# Patient Record
Sex: FEMALE | Race: WHITE | NOT HISPANIC OR LATINO | URBAN - METROPOLITAN AREA
[De-identification: names, ages, dates, MRNs, and addresses within clinical notes are randomized per-mention and may not be internally consistent; named-entity substitution may affect disease eponyms.]

---

## 2023-05-09 ENCOUNTER — INPATIENT (INPATIENT)
Facility: HOSPITAL | Age: 38
LOS: 1 days | Discharge: HOME | DRG: 391 | End: 2023-05-11
Attending: HOSPITALIST | Admitting: HOSPITALIST
Payer: MEDICAID

## 2023-05-09 VITALS
TEMPERATURE: 98 F | DIASTOLIC BLOOD PRESSURE: 84 MMHG | HEART RATE: 103 BPM | SYSTOLIC BLOOD PRESSURE: 126 MMHG | WEIGHT: 145.95 LBS | OXYGEN SATURATION: 99 % | RESPIRATION RATE: 18 BRPM | HEIGHT: 65 IN

## 2023-05-09 DIAGNOSIS — R13.10 DYSPHAGIA, UNSPECIFIED: ICD-10-CM

## 2023-05-09 DIAGNOSIS — F06.31 MOOD DISORDER DUE TO KNOWN PHYSIOLOGICAL CONDITION WITH DEPRESSIVE FEATURES: ICD-10-CM

## 2023-05-09 DIAGNOSIS — Z65.8 OTHER SPECIFIED PROBLEMS RELATED TO PSYCHOSOCIAL CIRCUMSTANCES: ICD-10-CM

## 2023-05-09 DIAGNOSIS — R63.4 ABNORMAL WEIGHT LOSS: ICD-10-CM

## 2023-05-09 DIAGNOSIS — F43.21 ADJUSTMENT DISORDER WITH DEPRESSED MOOD: ICD-10-CM

## 2023-05-09 LAB
ALBUMIN SERPL ELPH-MCNC: 4.2 G/DL — SIGNIFICANT CHANGE UP (ref 3.3–5)
ALP SERPL-CCNC: 36 U/L — LOW (ref 40–120)
ALT FLD-CCNC: 18 U/L — SIGNIFICANT CHANGE UP (ref 10–45)
AMPHET UR-MCNC: NEGATIVE — SIGNIFICANT CHANGE UP
ANION GAP SERPL CALC-SCNC: 11 MMOL/L — SIGNIFICANT CHANGE UP (ref 5–17)
AST SERPL-CCNC: 13 U/L — SIGNIFICANT CHANGE UP (ref 10–40)
BARBITURATES UR SCN-MCNC: NEGATIVE — SIGNIFICANT CHANGE UP
BASOPHILS # BLD AUTO: 0.03 K/UL — SIGNIFICANT CHANGE UP (ref 0–0.2)
BASOPHILS NFR BLD AUTO: 0.5 % — SIGNIFICANT CHANGE UP (ref 0–2)
BENZODIAZ UR-MCNC: NEGATIVE — SIGNIFICANT CHANGE UP
BILIRUB SERPL-MCNC: 0.8 MG/DL — SIGNIFICANT CHANGE UP (ref 0.2–1.2)
BUN SERPL-MCNC: 7 MG/DL — SIGNIFICANT CHANGE UP (ref 7–23)
CALCIUM SERPL-MCNC: 9.5 MG/DL — SIGNIFICANT CHANGE UP (ref 8.4–10.5)
CHLORIDE SERPL-SCNC: 104 MMOL/L — SIGNIFICANT CHANGE UP (ref 96–108)
CO2 SERPL-SCNC: 24 MMOL/L — SIGNIFICANT CHANGE UP (ref 22–31)
COCAINE METAB.OTHER UR-MCNC: NEGATIVE — SIGNIFICANT CHANGE UP
CREAT SERPL-MCNC: 1.04 MG/DL — SIGNIFICANT CHANGE UP (ref 0.5–1.3)
EGFR: 72 ML/MIN/1.73M2 — SIGNIFICANT CHANGE UP
EOSINOPHIL # BLD AUTO: 0.05 K/UL — SIGNIFICANT CHANGE UP (ref 0–0.5)
EOSINOPHIL NFR BLD AUTO: 0.8 % — SIGNIFICANT CHANGE UP (ref 0–6)
FLUAV AG NPH QL: SIGNIFICANT CHANGE UP
FLUBV AG NPH QL: SIGNIFICANT CHANGE UP
GLUCOSE SERPL-MCNC: 91 MG/DL — SIGNIFICANT CHANGE UP (ref 70–99)
HCT VFR BLD CALC: 44.6 % — SIGNIFICANT CHANGE UP (ref 34.5–45)
HGB BLD-MCNC: 15.3 G/DL — SIGNIFICANT CHANGE UP (ref 11.5–15.5)
IMM GRANULOCYTES NFR BLD AUTO: 0.2 % — SIGNIFICANT CHANGE UP (ref 0–0.9)
LIDOCAIN IGE QN: 72 U/L — LOW (ref 73–393)
LYMPHOCYTES # BLD AUTO: 1.63 K/UL — SIGNIFICANT CHANGE UP (ref 1–3.3)
LYMPHOCYTES # BLD AUTO: 26.5 % — SIGNIFICANT CHANGE UP (ref 13–44)
MCHC RBC-ENTMCNC: 29.9 PG — SIGNIFICANT CHANGE UP (ref 27–34)
MCHC RBC-ENTMCNC: 34.3 GM/DL — SIGNIFICANT CHANGE UP (ref 32–36)
MCV RBC AUTO: 87.1 FL — SIGNIFICANT CHANGE UP (ref 80–100)
METHADONE UR-MCNC: NEGATIVE — SIGNIFICANT CHANGE UP
MONOCYTES # BLD AUTO: 0.6 K/UL — SIGNIFICANT CHANGE UP (ref 0–0.9)
MONOCYTES NFR BLD AUTO: 9.8 % — SIGNIFICANT CHANGE UP (ref 2–14)
NEUTROPHILS # BLD AUTO: 3.82 K/UL — SIGNIFICANT CHANGE UP (ref 1.8–7.4)
NEUTROPHILS NFR BLD AUTO: 62.2 % — SIGNIFICANT CHANGE UP (ref 43–77)
NRBC # BLD: 0 /100 WBCS — SIGNIFICANT CHANGE UP (ref 0–0)
OPIATES UR-MCNC: NEGATIVE — SIGNIFICANT CHANGE UP
PCP SPEC-MCNC: SIGNIFICANT CHANGE UP
PCP UR-MCNC: NEGATIVE — SIGNIFICANT CHANGE UP
PLATELET # BLD AUTO: 243 K/UL — SIGNIFICANT CHANGE UP (ref 150–400)
POTASSIUM SERPL-MCNC: 3.8 MMOL/L — SIGNIFICANT CHANGE UP (ref 3.5–5.3)
POTASSIUM SERPL-SCNC: 3.8 MMOL/L — SIGNIFICANT CHANGE UP (ref 3.5–5.3)
PROT SERPL-MCNC: 7.7 G/DL — SIGNIFICANT CHANGE UP (ref 6–8.3)
RBC # BLD: 5.12 M/UL — SIGNIFICANT CHANGE UP (ref 3.8–5.2)
RBC # FLD: 12.1 % — SIGNIFICANT CHANGE UP (ref 10.3–14.5)
RSV RNA NPH QL NAA+NON-PROBE: SIGNIFICANT CHANGE UP
SARS-COV-2 RNA SPEC QL NAA+PROBE: SIGNIFICANT CHANGE UP
SODIUM SERPL-SCNC: 139 MMOL/L — SIGNIFICANT CHANGE UP (ref 135–145)
THC UR QL: NEGATIVE — SIGNIFICANT CHANGE UP
WBC # BLD: 6.14 K/UL — SIGNIFICANT CHANGE UP (ref 3.8–10.5)
WBC # FLD AUTO: 6.14 K/UL — SIGNIFICANT CHANGE UP (ref 3.8–10.5)

## 2023-05-09 PROCEDURE — 90792 PSYCH DIAG EVAL W/MED SRVCS: CPT

## 2023-05-09 PROCEDURE — 99221 1ST HOSP IP/OBS SF/LOW 40: CPT

## 2023-05-09 PROCEDURE — 71045 X-RAY EXAM CHEST 1 VIEW: CPT | Mod: 26

## 2023-05-09 PROCEDURE — 99223 1ST HOSP IP/OBS HIGH 75: CPT

## 2023-05-09 PROCEDURE — 93010 ELECTROCARDIOGRAM REPORT: CPT

## 2023-05-09 PROCEDURE — 99285 EMERGENCY DEPT VISIT HI MDM: CPT

## 2023-05-09 RX ORDER — SODIUM CHLORIDE 9 MG/ML
1000 INJECTION INTRAMUSCULAR; INTRAVENOUS; SUBCUTANEOUS ONCE
Refills: 0 | Status: COMPLETED | OUTPATIENT
Start: 2023-05-09 | End: 2023-05-09

## 2023-05-09 RX ORDER — ACETAMINOPHEN 500 MG
650 TABLET ORAL EVERY 6 HOURS
Refills: 0 | Status: DISCONTINUED | OUTPATIENT
Start: 2023-05-09 | End: 2023-05-11

## 2023-05-09 RX ORDER — ESCITALOPRAM OXALATE 10 MG/1
5 TABLET, FILM COATED ORAL
Refills: 0 | Status: DISCONTINUED | OUTPATIENT
Start: 2023-05-09 | End: 2023-05-11

## 2023-05-09 RX ORDER — LANOLIN ALCOHOL/MO/W.PET/CERES
3 CREAM (GRAM) TOPICAL AT BEDTIME
Refills: 0 | Status: DISCONTINUED | OUTPATIENT
Start: 2023-05-09 | End: 2023-05-11

## 2023-05-09 RX ORDER — RISPERIDONE 4 MG/1
0.25 TABLET ORAL AT BEDTIME
Refills: 0 | Status: DISCONTINUED | OUTPATIENT
Start: 2023-05-09 | End: 2023-05-11

## 2023-05-09 RX ORDER — SODIUM CHLORIDE 9 MG/ML
1000 INJECTION INTRAMUSCULAR; INTRAVENOUS; SUBCUTANEOUS
Refills: 0 | Status: DISCONTINUED | OUTPATIENT
Start: 2023-05-09 | End: 2023-05-11

## 2023-05-09 RX ORDER — ONDANSETRON 8 MG/1
4 TABLET, FILM COATED ORAL EVERY 8 HOURS
Refills: 0 | Status: DISCONTINUED | OUTPATIENT
Start: 2023-05-09 | End: 2023-05-11

## 2023-05-09 RX ADMIN — RISPERIDONE 0.25 MILLIGRAM(S): 4 TABLET ORAL at 21:37

## 2023-05-09 RX ADMIN — SODIUM CHLORIDE 1000 MILLILITER(S): 9 INJECTION INTRAMUSCULAR; INTRAVENOUS; SUBCUTANEOUS at 07:43

## 2023-05-09 RX ADMIN — ESCITALOPRAM OXALATE 5 MILLIGRAM(S): 10 TABLET, FILM COATED ORAL at 18:07

## 2023-05-09 NOTE — BH CONSULTATION LIAISON ASSESSMENT NOTE - SUMMARY
Pt is a 38 year old female who presents to [facility] for various physical and psychological complaints, psychiatry asked to evaluate patient for these sx.

## 2023-05-09 NOTE — H&P ADULT - ASSESSMENT
38F with no sign PMx with now progressive inability to talk and swallow, in addition to increased vocal tics, in setting of recent stressors, now admitted for inability to maintain adequate PO intake 38F with no sign PMx with now progressive inability to talk and swallow, in addition to increased vocal tics, in setting of recent stressors, now admitted for inability to maintain adequate PO intake    #Dysphagia  # 38F with no sign PMx with now progressive inability to talk and swallow, in addition to increased vocal tics, in setting of recent stressors, now admitted for inability to maintain adequate PO intake    #Dysphagia  #Hypophonia  #Vocal tics  #Possible psychosomatic disorder  -Possible underlying psychiatric component - symptoms started in setting of stressors (recent family deaths and decision to move out of the house)  -Psychiatry consult - case d/w [facility] psych  -Neurology consult - case d/w Dr. Peters  -Defer to psych and neuro if needs MRI brain (inpatient vs outpatient)  -Defer to psych/neuro if need to start benzos for tic disorder  -Speech eval for dysphagia    Case d/w patient's mother at bedside, all questions answered, agrees with plan

## 2023-05-09 NOTE — CONSULT NOTE ADULT - SUBJECTIVE AND OBJECTIVE BOX
General Neurology  Consult Note    HPI: General Neurology  Consult Note    HPI:  This is a 38F with a PMHx of anxiety (untreated, does not see a psychiatrist) who presents for "unable to swallow and speak." Patient has been treated on/off for sinusitis with antibiotics over the last 2 years - has been on intermittent antibiotics and steroids.Patient lives with her mother at home - and decided about 1 month ago she was going to move out of the house - which then resulted in "vocal tics" (blurts out words uncontrollably), and gradual decreased ability to swallow and speak. Also, had three close relatives die recently. She can whisper if she pinches her neck and only ate aaron sips of soup yesterday. Patient saw an outpatient movement disorder specialist (Dr. Saunders) who recommend CBT. She did not make a psychiatry appt and now comes to the ED. ROS + for 20 lbs weight loss in past several weeks, anxiety/depression. No suicidal thoughts.    Neurology was consulted for hypophonic, dysphagia and tics.  General Neurology  Consult Note    HPI:  This is a 38F with a PMHx of anxiety (untreated, does not see a psychiatrist) who presents for "unable to swallow and speak." Patient has been treated on/off for sinusitis with antibiotics over the last 2 years - has been on intermittent antibiotics and steroids. Patient lives with her mother at home - and decided about 1 month ago she was going to move out of the house - which then resulted in "vocal tics" (blurts out words uncontrollably), and gradual decreased ability to swallow and speak. Also, had three close relatives die recently. She can whisper if she pinches her neck and only ate aaron sips of soup yesterday. Patient saw an outpatient movement disorder specialist (Dr. Saunders) who recommend CBT. She did not make a psychiatry appt and now comes to the ED. ROS + for 20 lbs weight loss in past several weeks, anxiety/depression. No suicidal thoughts.    Neurology was consulted for hypophonic, dysphagia and tics.   Per the patient's mother, the patient has been under increased stress. Her symptoms started a few months after she was started on the steroids.     She denies headache, changes in vision, difficulty swallow. She is mute other than when she has her vocal tics where she says a unclear word without dysarthria, no profanity, and when she gently squeezes her trachea, she has a choppy voice. She denies LOC. She reports some trouble with balance but is otherwise able to walk.

## 2023-05-09 NOTE — SWALLOW BEDSIDE ASSESSMENT ADULT - SLP PERTINENT HISTORY OF CURRENT PROBLEM
38F with no sig PMHx although does have underlying anxiety (untreated, does not see a psychiatrist) who presents for "unable to swallow and speak." Patient has been treated on/off for sinusitis with antibiotics over the last 2 years - has been on intermittent antibiotics and steroids. Other than that, no sig hx. Patient lives with her mother at home - and decided about 1 month ago she was going to move out of the house - which then resulted in "vocal tics" (blurts out words uncontrollably), and gradual decrease ability to swallow and speak. Also had three close relates die recently. She can whisper if she pinches her neck and only ate aaron sips of soup yesterday. Patient saw an outpatient movement disorder specialist (Dr. Saunders) who recommend CBT. She did not make a psychiatry appt and now comes to the ED. ROS + for 20 lbs weight loss in past several weeks, anxiety/depression. No suicidal thoughts.

## 2023-05-09 NOTE — ED ADULT TRIAGE NOTE - LOCATION:
Left arm; Impairments in bilateral LE active ROM and strength right > left, functional mobility, posture, coordination, and balance.

## 2023-05-09 NOTE — BH CONSULTATION LIAISON ASSESSMENT NOTE - NSBHCHARTREVIEWLAB_PSY_A_CORE FT
15.3   6.14  )-----------( 243      ( 09 May 2023 07:45 )             44.6   05-09    139  |  104  |  7   ----------------------------<  91  3.8   |  24  |  1.04    Ca    9.5      09 May 2023 07:45    TPro  7.7  /  Alb  4.2  /  TBili  0.8  /  DBili  x   /  AST  13  /  ALT  18  /  AlkPhos  36<L>  05-09

## 2023-05-09 NOTE — BH CONSULTATION LIAISON ASSESSMENT NOTE - NSBHCONSULTRECOMMENDOTHER_PSY_A_CORE FT
For mood symptoms, start Lexapro 5mg PO Q daily, increase to 10mg after several days if tolerated.   Agree with neurology consult. Would recommend auto-immune work up.   Speech and swallow evaluation.   Consider CT of head and if negative would pursue an MRI of the brain.  Consider Risperdal trial to lessen tic sx, would start Risperdal 0.25mg PO Q HS- defer until neurology work up is completed.     Pt should be referred for CBT and grief counseling. Pt should be encouraged to establish an outpatient psychiatric prescriber for medication management. [facility] refer to [facility]. Address:  [address], [place] [address], Phone: [phone] For mood symptoms, start Lexapro 5mg PO Q daily, increase to 10mg after several days if tolerated.   Agree with neurology consult. Would recommend auto-immune work up.   Speech and swallow evaluation.  Consider CT of head and if negative would pursue an MRI of the brain.  Start Risperdal trial to lessen tic sx, would start Risperdal 0.25mg PO Q HS  Ativan 0.5 PO BID as needed for anxiety sx.   Pt should be referred for CBT and grief counseling. Pt should be encouraged to establish an outpatient psychiatric prescriber for medication management. Would refer to [facility]. Address: [address], [place] [address], Phone: [phone]

## 2023-05-09 NOTE — H&P ADULT - HISTORY OF PRESENT ILLNESS
This is a 38F with no sig PMHx although does have underlying anxiety (untreated, does not see a psychiatrist) who presents for "unable to swallow and speak." Patient has been treated on/off for sinusitis with antibiotics over the last 2 years - has been on intermittent antibiotics and steroids. Other than that, no sig hx. Patient lives with her mother at home - and decided about 1 month ago she was going to move out of the house - which then resulted in "vocal tics" (blurts out words uncontrollably), and gradual decrease ability to swallow and speak. Also had three close relates die recently. She can whisper if she pinches her neck and only ate aaron sips of soup yesterday. Patient saw an outpatient movement disorder specialist (Dr. Saunders) who recommend CBT. She did not make a psychiatry appt and now comes to the ED. ROS + for 20 lbs weight loss in past several weeks, anxiety/depression. No suicidal thoughts.

## 2023-05-09 NOTE — SWALLOW BEDSIDE ASSESSMENT ADULT - SWALLOW EVAL: DIAGNOSIS
Swallow pathophysiology Pt presents with a grossly functional oropharyngeal swallow, however overall intake impacted by psychological component as "eating and speaking" exacerbate onset of Tics. Oral motor peripheral examination unremarkable. Pt communicates via alphabet board/gestures, however able to verbalize with humming/melodic cuing. +vocal tics present. Pt self fed small amounts of thin liquids, puree and regular solids. Adequate oral grading, anterior containment, mastication and suspected adequate AP transport. Good oral clearance. Pharyngeal motor response appreciated, however noted with multiple swallows per bolus. Pt denies dysphagia/odynophagia, however says it's in her "mind". no overt s/sx of aspiration. Recommend regular/thin with consideration of food/drink preferences and encouragement. Defer to psychology/psychiatry and neurology.

## 2023-05-09 NOTE — CONSULT NOTE ADULT - MOTOR
5/5 strength aside from 4/5 bilateral  strength.  Mild shoulder shrugging when she has her vocalizations

## 2023-05-09 NOTE — BH CONSULTATION LIAISON ASSESSMENT NOTE - ATTENDING COMMENTS
Pt seen and evaluated today, high in the differential is a conversion disorder with neurological sx.   Pt with normal power in her hand, the ability to produce speech, a cough/tic which possibly worsens under stress, and   complicated family dynamics.   Pt agreed to trial of Risperdal ( for the tic), Lexapro- ( to treat anxiety/ and agoraphobic sx), and Ativan prn, as pt has "terror" of falling asleep in case she has to "flee". Pt did not demonstrate overt psychosis, paranoid ideation, will continue to evaluate and monitor response to medications.

## 2023-05-09 NOTE — BH CONSULTATION LIAISON ASSESSMENT NOTE - HPI (INCLUDE ILLNESS QUALITY, SEVERITY, DURATION, TIMING, CONTEXT, MODIFYING FACTORS, ASSOCIATED SIGNS AND SYMPTOMS)
Pending full consult  This is a 38F with no sig PMHx although does have underlying anxiety (untreated, does not see a psychiatrist) who presents for "unable to swallow and speak." Patient has been treated on/off for sinusitis with antibiotics over the last 2 years - has been on intermittent antibiotics and steroids. Other than that, no sig hx. Patient lives with her mother at home - and decided about 1 month ago she was going to move out of the house - which then resulted in "vocal tics" (blurts out words uncontrollably), and gradual decrease ability to swallow and speak. Also had three close relates die recently. She can whisper if she pinches her neck and only ate aaron sips of soup yesterday. Patient saw an outpatient movement disorder specialist (Dr. Saunders) who recommend CBT. She did not make a psychiatry appt and now comes to the ED. ROS + for 20 lbs weight loss in past several weeks, anxiety/depression. No suicidal thoughts. (09 May 2023 09:28)    Psychiatry C/L: Asked to evaluate this 38 year old female for various physical and psychological complaints. Pt seen and evaluated at bedside, she is pleasant, calm and cooperative with writer. Pt reports she cannot speak or swallow and is using nonverbal ways to communicate with writer including shaking her head (yes or no) as well as an alphabet chart and hand gestures. Her mother provides most of the hx. Pt reports 2 months ago she suddenly developed a tic and has sudden twitches and nonsensical sounds. In addition to the tic, she has difficulty eating and swallowing and has a 20 lbs weight loss in the last month. Sleep- fair, appetite- poor, energy- low, concentration- poor. Pt reports she has several active stressors in her life and is not coping well: Her father  in , grandmother  in  and her uncle  in 2021 (she was close with all). Pt was planning to move out of her mothers house and into her own space 1 month ago which created more stress for her. Pts admits she has been anxious and depressed, exacerbated by the COVID-19 pandemic. Since the pandemic began, pt has been isolated and not leaving her home. Anxiety sx- intermittent worrisome thoughts, muscle tension, palpitations, sweating and SOB.  Pt has been evaluated by various medical specialist (Pulmonologist, PMD, neurologist)- all suggesting pt has a psychiatric illness as oppose to a medical etiology. She was referred to CBT and to an outpatient psychiatrist but has not followed up- had difficulty establishing an appt. Pt is not found to be suicidal, homicidal, manic or psychotic. See recs below.  This is a 38F with no sig PMHx although does have underlying anxiety (untreated, does not see a psychiatrist) who presents for "unable to swallow and speak." Patient has been treated on/off for sinusitis with antibiotics over the last 2 years - has been on intermittent antibiotics and steroids. Other than that, no sig hx. Patient lives with her mother at home - and decided about 1 month ago she was going to move out of the house - which then resulted in "vocal tics" (blurts out words uncontrollably), and gradual decrease ability to swallow and speak. Also had three close relates die recently. She can whisper if she pinches her neck and only ate aaron sips of soup yesterday. Patient saw an outpatient movement disorder specialist (Dr. Saunders) who recommend CBT. She did not make a psychiatry appt and now comes to the ED. ROS + for 20 lbs weight loss in past several weeks, anxiety/depression. No suicidal thoughts. (09 May 2023 09:28)    Psychiatry C/L: Asked to evaluate this 38 year old female for various physical and psychological complaints. Pt seen and evaluated at bedside, she is pleasant, calm and cooperative with writer. Pt reports she has difficulty speaking and swallow and is using nonverbal ways to communicate with writer including shaking her head (yes or no) as well as an alphabet chart and hand gestures. She is able to produce several words at a time and has hypophonic speech. Her mother provides most of the hx. Pt reports 2 months ago she suddenly developed a tic and has sudden twitches and nonsensical sounds. In addition to the tic, she has difficulty eating and swallowing and has a 20 lbs weight loss in the last month. Sleep- fair, appetite- poor, energy- low, concentration- poor. Pt reports she has several active stressors in her life and is not coping well: Her father  in , grandmother  in  and her uncle  in 2021 (she was close with all). Pt was planning to move out of her mothers house and into her own space 1 month ago which created more stress for her. Pts admits she has been anxious and depressed, exacerbated by the COVID-19 pandemic. Since the pandemic began, pt has been isolated and not leaving her home. Anxiety sx- intermittent worrisome thoughts, muscle tension, palpitations, sweating and SOB.  Pt has been evaluated by various medical specialist (Pulmonologist, PMD, neurologist, speech therapist)- all suggesting pt has a psychiatric illness as oppose to a medical etiology. She was referred to CBT and to an outpatient psychiatrist but has not followed up- had difficulty establishing an appt. Pt is not found to be suicidal, homicidal, manic or psychotic. See recs below.

## 2023-05-09 NOTE — ED PROVIDER NOTE - OBJECTIVE STATEMENT
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

## 2023-05-09 NOTE — ED ADULT NURSE NOTE - NSIMPLEMENTINTERV_GEN_ALL_ED
Implemented All Universal Safety Interventions:  [place] to call system. Call bell, personal items and telephone within reach. Instruct patient to call for assistance. Room bathroom lighting operational. Non-slip footwear when patient is off stretcher. Physically safe environment: no spills, clutter or unnecessary equipment. Stretcher in lowest position, wheels locked, appropriate side rails in place.

## 2023-05-09 NOTE — H&P ADULT - NSHPPHYSICALEXAM_GEN_ALL_CORE
Vital Signs Last 24 Hrs  T(F): 98.4 (09 May 2023 06:56), Max: 98.4 (09 May 2023 06:56)  HR: 103 (09 May 2023 06:56) (103 - 103)  BP: 126/84 (09 May 2023 06:56) (126/84 - 126/84)  RR: 18 (09 May 2023 06:56) (18 - 18)  SpO2: 99% (09 May 2023 06:56) (99% - 99%)    PHYSICAL EXAM:  GENERAL: NAD  HEAD:  Atraumatic, Normocephalic  EYES: EOMI, conjunctiva and sclera clear  ENMT: No gross hearing impairment, Moist mucous membranes, Good dentition, no thrush  NECK: Supple, No JVD  CHEST/LUNG: Clear to auscultation bilaterally, good air entry, non-labored breathing  HEART: RRR; S1/S2, No murmur  ABDOMEN: Soft, Nontender, Nondistended; Bowel sounds present  VASCULAR: Normal pulses, Normal capillary refill  EXTREMITIES: No calf tenderness, No cyanosis, No pitting edema  LYMPH: Normal; No lymphadenopathy noted  SKIN: Warm, Intact, No rashes noted  PSYCH: Normal mood, anxious, hypophonic  NERVOUS SYSTEM:  Good concentration; CN 2-12 intact, No focal deficits, + vocal tic

## 2023-05-09 NOTE — BH CONSULTATION LIAISON ASSESSMENT NOTE - NSSUICPROTFACT_PSY_ALL_CORE
Responsibility to children, family, or others/Identifies reasons for living/Positive therapeutic relationships/Sikhism beliefs

## 2023-05-09 NOTE — BH CONSULTATION LIAISON ASSESSMENT NOTE - DESCRIPTION
White female  Born in [place]  Lives with mother, father, step father and siblings in [place], [place]  Pt has a twin sister and also has twin brothers (age 14)  Single  Has no children  Completed associates degree at [employer]  Pt views herself as Mandaen and is Taoist

## 2023-05-09 NOTE — PATIENT PROFILE ADULT - FUNCTIONAL ASSESSMENT - DAILY ACTIVITY SCORE.
Any changes in Ambulatory Summary Sheet? No    Objective:   3030  R UT tightness >L       R  100,92,95 Av.67 lbs   L : 95,95,94 Av.67 lbs    Flexion: 4/5  R side bendin/5       L side bendin/5  R rotation: 4+/5    L rotation:  4+/5  NDI: 17 = moderate disability    Prior to today's treatment session, patient was screened for signs and symptoms related to COVID-19 including but not limited to verbally answering questions related to feeling ill, cough, or SOB, along with taking temperature via forehead thermometer. Patient presented with all negative signs and symptoms and had no fever >100 degrees Fahrenheit this date. Exercises: (No more than 4 columns)   Exercise/Equipment 2020 Date: 2020           WARM UP         UBE   3/3 F/B 3' forward/3' backward         TABLE      Manual therapy  See below See below   AROM  Not performed    Scapular retraction  1x15  1x20   Shoulder elevation/depression  1x15 1x20   Scapular punches  1x15 1x20   Prone press ups  Not performed    Prone HABD  Not performed       Prone row  Not performed     seated  Scapular protraction 1x15                                             PROPRIOCEPTION                                    MODALITIES See below See below See below                 Other Therapeutic Activities/Education:  Pt educated on EMG findings and myofacial problems. Home Exercise Program:  Pt to continue current HEP. Manual Treatments:  Patient seated manual therapy STM/ TPR to B UT, MT, and LT as well as intrascapular muscles using elbow to go deeper. Modalities:  Interferrential x15' seated to B UT/MT    Communication with other providers: none    Assessment:  (Response towards treatment session) (Pain Rating)  Pain rated 5/10 after treatment with no HA. Pt reports he feels much looser especially on R side. Pt reports he continues to have a spot on L side causing problems.      Plan for Next Session: 24

## 2023-05-09 NOTE — PATIENT PROFILE ADULT - FALL HARM RISK - UNIVERSAL INTERVENTIONS
Bed in lowest position, wheels locked, appropriate side rails in place/Call bell, personal items and telephone in reach/Instruct patient to call for assistance before getting out of bed or chair/Non-slip footwear when patient is out of bed/[place] to call system/Physically safe environment - no spills, clutter or unnecessary equipment/Purposeful Proactive Rounding/Room/bathroom lighting operational, light cord in reach

## 2023-05-09 NOTE — ED ADULT TRIAGE NOTE - CHIEF COMPLAINT QUOTE
c/o decreased functionality, weight loss, dysphagia. Per mother pt. is now unable to talk or eat well since recent diagnosis of neurological functioning disorder. Neurologist recommended speech therapist and psychologist f/u. Per mother pt. has gone to both x1 since dx. Pt. unable to speak but is able to answer questions by nodding or hand gestures.

## 2023-05-09 NOTE — ED ADULT NURSE NOTE - OBJECTIVE STATEMENT
Pt c/o decreased functionality, weight loss, dysphagia. Per mother pt. is now unable to talk or eat well since recent diagnosis of neurological functioning disorder. Neurologist recommended speech therapist and psychologist f/u. Per mother pt. has gone to both x1 since dx. Pt. unable to speak but is able to answer questions by nodding or hand gestures.

## 2023-05-09 NOTE — SWALLOW BEDSIDE ASSESSMENT ADULT - PHARYNGEAL PHASE
suspect multiple swallows behavioral/psychologica/Multiple swallows suspect multiple swallows behavioral/psychological/Multiple swallows

## 2023-05-09 NOTE — ED PROVIDER NOTE - PHYSICAL EXAMINATION
General:     NAD, well-nourished, well-appearing  Head:     NC/AT, EOMI, oral mucosa dry  Neck:     trachea midline  Lungs:     CTA b/l, no w/r/r  CVS:     S1S2, RRR, no m/g/r  Abd:     +BS, s/nt/nd, no organomegaly  Ext:    2+ radial and pedal pulses, no c/c/e  Neuro: AA Unable to speak able to comprehend follows response, no sensory/motor deficits

## 2023-05-09 NOTE — SWALLOW BEDSIDE ASSESSMENT ADULT - COMMENTS
WBC: 6.14  CXR (5/9): lungs are clear    *Per pt and pt's mother: pt with similar swallowing difficulty in 2021 after her grandmother passed. She received counseling/therapy for 5 months, swallowing was "back to normal". Also reports she had a FEES conducted in September 2022, revealing WNL oropharyngeal functioning. Swallowing and speaking difficulty, and tics started in March 2023 and have progressively worsened now. Dx of FND/conversion given by neurologist who recommend CBT, psychiatry and speech/swallow therapy OP. WBC: 6.14  CXR (5/9): lungs are clear    *Per pt and pt's mother: pt with similar swallowing difficulty in 2021 after her grandmother passed. She received counseling/therapy for 5 months, swallowing was "back to normal". Also reports she had a FEES conducted in September 2022, revealing WNL oropharyngeal functioning. Swallowing and speaking difficulty, and tics started in March 2023 and have progressively worsened now. FND/conversion disorder diagnosed by neurologist who recommend CBT, psychiatry and speech/swallow therapy OP.

## 2023-05-09 NOTE — BH CONSULTATION LIAISON ASSESSMENT NOTE - NSICDXBHSECONDARYDX_PSY_ALL_CORE
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

## 2023-05-09 NOTE — BH CONSULTATION LIAISON ASSESSMENT NOTE - NSICDXBHTERTIARYDX_PSY_ALL_CORE
R/O OCD (obsessive compulsive disorder)   F42.9  R/O Conversion disorder   F44.9   R/O OCD (obsessive compulsive disorder)   F42.9  R/O Conversion disorder   F44.9  R/O Agoraphobia, unspecified   F40.00

## 2023-05-09 NOTE — ED PROVIDER NOTE - CLINICAL SUMMARY MEDICAL DECISION MAKING FREE TEXT BOX
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

## 2023-05-09 NOTE — CONSULT NOTE ADULT - GAIT/BALANCE
Gait is normal. Slight trouble with tandem walking.   Romberg negative  Reflexes are 2+ diffusely  Plantar responses are downgoing

## 2023-05-09 NOTE — H&P ADULT - NSHPLABSRESULTS_GEN_ALL_CORE
.                            15.3   6.14  )-----------( 243      ( 09 May 2023 07:45 )             44.6       05-09    139  |  104  |  7   ----------------------------<  91  3.8   |  24  |  1.04    Ca    9.5      09 May 2023 07:45    TPro  7.7  /  Alb  4.2  /  TBili  0.8  /  DBili  x   /  AST  13  /  ALT  18  /  AlkPhos  36  05-09      Lipase, Serum: 72 U/L (05-09-23 @ 07:45)      History was obtained form patient's mother as the patient is unable to speak and provide a history due to her current clinical state

## 2023-05-09 NOTE — BH CONSULTATION LIAISON ASSESSMENT NOTE - CURRENT MEDICATION
MEDICATIONS  (STANDING):  sodium chloride 0.9%. 1000 milliLiter(s) (70 mL/Hr) IV Continuous <Continuous>    MEDICATIONS  (PRN):  acetaminophen     Tablet .. 650 milliGRAM(s) Oral every 6 hours PRN Temp greater or equal to 38C (100.4F), Mild Pain (1 - 3)  aluminum hydroxide/magnesium hydroxide/simethicone Suspension 30 milliLiter(s) Oral every 4 hours PRN Dyspepsia  melatonin 3 milliGRAM(s) Oral at bedtime PRN Insomnia  ondansetron Injectable 4 milliGRAM(s) IV Push every 8 hours PRN Nausea and/or Vomiting

## 2023-05-09 NOTE — ED ADULT TRIAGE NOTE - STATUS:
Applied Niacinamide Counseling: I recommended taking niacin or niacinamide, also know as vitamin B3, twice daily. Recent evidence suggests that taking vitamin B3 (500 mg twice daily) can reduce the risk of actinic keratoses and non-melanoma skin cancers. Side effects of vitamin B3 include flushing and headache.

## 2023-05-09 NOTE — CONSULT NOTE ADULT - ASSESSMENT
This is a 38F with a PMHx of anxiety (untreated, does not see a psychiatrist) who presents for "unable to swallow and speak."    The cause of the patient's symptoms are unclear. Psychiatry is starting new medications on her, including Risperidone, Lexapro and Ativan.    Given her age, recommend MRI brain with and without contrast.     Remainder of care per primary team.    Thank you for the consult.

## 2023-05-09 NOTE — BH CONSULTATION LIAISON ASSESSMENT NOTE - NSBHCHARTREVIEWVS_PSY_A_CORE FT
Vital Signs Last 24 Hrs  T(C): 36.9 (09 May 2023 06:56), Max: 36.9 (09 May 2023 06:56)  T(F): 98.4 (09 May 2023 06:56), Max: 98.4 (09 May 2023 06:56)  HR: 103 (09 May 2023 06:56) (103 - 103)  BP: 126/84 (09 May 2023 06:56) (126/84 - 126/84)  BP(mean): --  RR: 18 (09 May 2023 06:56) (18 - 18)  SpO2: 99% (09 May 2023 06:56) (99% - 99%)    Parameters below as of 09 May 2023 06:56  Patient On (Oxygen Delivery Method): room air

## 2023-05-09 NOTE — SWALLOW BEDSIDE ASSESSMENT ADULT - SLP GENERAL OBSERVATIONS
A&Ox4, communicating via alphabet board however able to vocalize when tics are triggered and verbalize intelligible words with humming and melodic cuing. Pt noted to be anxious and particular with tray set-up/hygeine/food preferences.

## 2023-05-09 NOTE — BH CONSULTATION LIAISON ASSESSMENT NOTE - OTHER PAST PSYCHIATRIC HISTORY (INCLUDE DETAILS REGARDING ONSET, COURSE OF ILLNESS, INPATIENT/OUTPATIENT TREATMENT)
Saw a therapist twice virtually, has not followed up.   No previous inpatient stays. No known SA or SIB.   Pt reports having OCD sx in the past (having to constantly clean surfaces, frequently checking the plug for her flat iron, checking the stove), no sx recently.

## 2023-05-10 PROBLEM — Z00.00 ENCOUNTER FOR PREVENTIVE HEALTH EXAMINATION: Status: ACTIVE | Noted: 2023-05-10

## 2023-05-10 PROBLEM — Z78.9 OTHER SPECIFIED HEALTH STATUS: Chronic | Status: ACTIVE | Noted: 2023-05-09

## 2023-05-10 PROCEDURE — 99233 SBSQ HOSP IP/OBS HIGH 50: CPT

## 2023-05-10 RX ADMIN — ESCITALOPRAM OXALATE 5 MILLIGRAM(S): 10 TABLET, FILM COATED ORAL at 18:13

## 2023-05-10 RX ADMIN — RISPERIDONE 0.25 MILLIGRAM(S): 4 TABLET ORAL at 21:43

## 2023-05-10 NOTE — PROGRESS NOTE ADULT - ASSESSMENT
38F with no sign PMx with now progressive inability to talk and swallow, in addition to increased vocal tics, in setting of recent stressors, now admitted for inability to maintain adequate PO intake    #Dysphagia  #Hypophonia  #Vocal tics  #Possible psychosomatic disorder  -Possible underlying psychiatric component - symptoms started in setting of stressors (recent family deaths and decision to move out of the house)--patient is convinced combo of claritin with prednisone in march triggered symptoms.   -Psychiatry consult - started rispersal and lexapro 50 mg  -Neurology consult - Dr lua-  - Obtain MRI head with and without contrast--paperwork completed 5/10   - S+S eval with regular diet w thin liquids  -Speech eval for dysphagia    Case d/w patient's mother at bedside and facetimes with sister Mackenzie. All questions answered, agrees with plan  Dispo: pending MRI, psych and neuro   38F with no sign PMx with now progressive inability to talk and swallow, in addition to increased vocal tics, in setting of recent stressors, now admitted for inability to maintain adequate PO intake    #Dysphagia  #Hypophonia  #Vocal tics  #Possible psychosomatic disorder  -Possible underlying psychiatric component - symptoms started in setting of stressors (recent family deaths and decision to move out of the house)--patient is convinced combo of claritin with prednisone in march triggered symptoms.   -Psychiatry consult - started rispersal and lexapro 5 mg  -Neurology consult - Dr lua-  - Obtain MRI head with and without contrast--paperwork completed 5/10   - S+S eval with regular diet w thin liquids  -Speech eval for dysphagia    Case d/w patient's mother at bedside and facetimes with sister Mackenzie. All questions answered, agrees with plan  Dispo: pending MRI, psych and neuro

## 2023-05-10 NOTE — BH CONSULTATION LIAISON PROGRESS NOTE - NSBHFUPINTERVALHXFT_PSY_A_CORE
This is a 38F with no sig PMHx although does have underlying anxiety (untreated, does not see a psychiatrist) who presents for "unable to swallow and speak." Patient has been treated on/off for sinusitis with antibiotics over the last 2 years - has been on intermittent antibiotics and steroids. Other than that, no sig hx. Patient lives with her mother at home - and decided about 1 month ago she was going to move out of the house - which then resulted in "vocal tics" (blurts out words uncontrollably), and gradual decrease ability to swallow and speak. Also had three close relates die recently. She can whisper if she pinches her neck and only ate aaron sips of soup yesterday. Patient saw an outpatient movement disorder specialist (Dr. Saunders) who recommend CBT. She did not make a psychiatry appt and now comes to the ED. ROS + for 20 lbs weight loss in past several weeks, anxiety/depression. No suicidal thoughts. (09 May 2023 09:28)    Psychiatry C/L: Asked to evaluate this 38 year old female for various physical and psychological complaints. Pt seen and evaluated at bedside, she is pleasant, calm and cooperative with writer. Pt reports she has difficulty speaking and swallowing and is using nonverbal ways to communicate with writer including shaking her head (yes or no) as well as an alphabet chart and hand gestures. She is able to produce several words at a time but has hypophonic speech. Her mother provides most of the hx. Pt reports 2 months ago she suddenly developed a tic and has sudden twitches and nonsensical sounds. In addition to the tic, she has difficulty eating and swallowing and has a 20 lbs weight loss in the last month. Sleep- fair, appetite- poor, energy- low, concentration- poor. Pt reports she has several active stressors in her life and is not coping well: Her father  in , grandmother  in  and her uncle  in 2021 (she was close with all). Pt was planning to move out of her mothers house and into her own space 1 month ago which created more stress for her. Pts admits she has been anxious and depressed, exacerbated by the COVID-19 pandemic. Since the pandemic began, pt has been isolated and not leaving her home. Anxiety sx- intermittent worrisome thoughts, muscle tension, palpitations, sweating and SOB.  Pt has been evaluated by various medical specialist (Pulmonologist, PMD, neurologist, speech therapist)- all suggesting pt has a psychiatric illness as oppose to a medical etiology. She was referred to CBT and to an outpatient psychiatrist but has not followed up- had difficulty establishing an appt. Pt is not found to be suicidal, homicidal, manic or psychotic. See recs below.  (09 May 2023 13:07)    Psychiatry interval 5/10: Pt reports she slept better last night, other than that, no other changes noted. She tolerated her first dose of Risperdal and Lexapro. Swallow evaluation completed yesterday, notes read and appreciated- (grossly functional oropharyngeal swallow, however overall intake impacted by psychological component as "eating and speaking" exacerbate onset of Tics). Pt is pending MRI of head at his time. Psychiatry will continue to follow.

## 2023-05-10 NOTE — DIETITIAN NUTRITION RISK NOTIFICATION - TREATMENT: THE FOLLOWING DIET HAS BEEN RECOMMENDED
Diet, Regular:   Supplement Feeding Modality:  Oral  Ensure Enlive Cans or Servings Per Day:  2       Frequency:  Three Times a day (05-10-23 @ 09:51) [Active]

## 2023-05-10 NOTE — DIETITIAN INITIAL EVALUATION ADULT - NS FNS DIET ORDER
Diet, Regular:   Supplement Feeding Modality:  Oral  Ensure Enlive Cans or Servings Per Day:  2       Frequency:  Three Times a day (05-10-23 @ 09:51)

## 2023-05-10 NOTE — BH CONSULTATION LIAISON PROGRESS NOTE - OTHER
Pt able to voice several words at a time despite having hypophonic speech.  appears linear regarding physical sx.  hypophonic

## 2023-05-10 NOTE — DIETITIAN INITIAL EVALUATION ADULT - PERTINENT LABORATORY DATA
05-09    139  |  104  |  7   ----------------------------<  91  3.8   |  24  |  1.04    Ca    9.5      09 May 2023 07:45    TPro  7.7  /  Alb  4.2  /  TBili  0.8  /  DBili  x   /  AST  13  /  ALT  18  /  AlkPhos  36<L>  05-09

## 2023-05-10 NOTE — DIETITIAN INITIAL EVALUATION ADULT - ORAL INTAKE PTA/DIET HISTORY
Pt's mother present at bedside during interview to help interpret and provide additional information. Pt with poor appetite prior to admission. Per mother, pt has no problem with swallowing but reports vocal tics causing the patient to cough which is interfering with PO intakes.  Pt's mother present at bedside during interview to help interpret and provide additional information. Pt with poor PO intakes prior to admission. Per mother, pt has with vocal tics/coughing which she states is the cause of the pt's inadequate oral intakes and 20 lb unintentional weight loss within 2 months. Multiple food allergies confirmed to apples, peaches, & pineapples.  Pt's mother present at bedside during interview to help interpret and provide additional information. Pt with poor PO intakes prior to admission. Per mother, pt with vocal tics/coughing which she states is the cause of the pt's inadequate oral intakes and 20 lb unintentional weight loss within 2 months. Multiple food allergies confirmed to apples, peaches, & pineapples.

## 2023-05-10 NOTE — DIETITIAN INITIAL EVALUATION ADULT - PERTINENT MEDS FT
MEDICATIONS  (STANDING):  escitalopram 5 milliGRAM(s) Oral <User Schedule>  risperiDONE  Disintegrating Tablet 0.25 milliGRAM(s) Oral at bedtime  sodium chloride 0.9%. 1000 milliLiter(s) (70 mL/Hr) IV Continuous <Continuous>    MEDICATIONS  (PRN):  acetaminophen     Tablet .. 650 milliGRAM(s) Oral every 6 hours PRN Temp greater or equal to 38C (100.4F), Mild Pain (1 - 3)  aluminum hydroxide/magnesium hydroxide/simethicone Suspension 30 milliLiter(s) Oral every 4 hours PRN Dyspepsia  LORazepam     Tablet 0.5 milliGRAM(s) Oral two times a day PRN Anxiety  melatonin 3 milliGRAM(s) Oral at bedtime PRN Insomnia  ondansetron Injectable 4 milliGRAM(s) IV Push every 8 hours PRN Nausea and/or Vomiting

## 2023-05-10 NOTE — DIETITIAN INITIAL EVALUATION ADULT - ADD RECOMMEND
1. Ensure Plus High Protein 8oz PO TID (Provides 1,050kcal & 60grams of Protein) to enhance PO intakes and optimize nutritional status   2. Activa yogurt smoothie TID   3. Honor patients daily food preferences as tolerated  4. Monitor daily PO intakes, GI tolerance, labs, weights, skin integrity, & BM regularity   5. Follow up with SLP recommendations

## 2023-05-11 ENCOUNTER — APPOINTMENT (OUTPATIENT)
Dept: MRI IMAGING | Facility: HOSPITAL | Age: 38
End: 2023-05-11

## 2023-05-11 ENCOUNTER — TRANSCRIPTION ENCOUNTER (OUTPATIENT)
Age: 38
End: 2023-05-11

## 2023-05-11 VITALS
RESPIRATION RATE: 15 BRPM | OXYGEN SATURATION: 97 % | DIASTOLIC BLOOD PRESSURE: 80 MMHG | SYSTOLIC BLOOD PRESSURE: 110 MMHG | HEART RATE: 91 BPM

## 2023-05-11 DIAGNOSIS — F06.1 CATATONIC DISORDER DUE TO KNOWN PHYSIOLOGICAL CONDITION: ICD-10-CM

## 2023-05-11 PROCEDURE — 85025 COMPLETE CBC W/AUTO DIFF WBC: CPT

## 2023-05-11 PROCEDURE — 99231 SBSQ HOSP IP/OBS SF/LOW 25: CPT

## 2023-05-11 PROCEDURE — 99233 SBSQ HOSP IP/OBS HIGH 50: CPT

## 2023-05-11 PROCEDURE — 99239 HOSP IP/OBS DSCHRG MGMT >30: CPT

## 2023-05-11 PROCEDURE — A9579: CPT

## 2023-05-11 PROCEDURE — 83690 ASSAY OF LIPASE: CPT

## 2023-05-11 PROCEDURE — 87637 SARSCOV2&INF A&B&RSV AMP PRB: CPT

## 2023-05-11 PROCEDURE — 71045 X-RAY EXAM CHEST 1 VIEW: CPT

## 2023-05-11 PROCEDURE — 97166 OT EVAL MOD COMPLEX 45 MIN: CPT

## 2023-05-11 PROCEDURE — 92610 EVALUATE SWALLOWING FUNCTION: CPT

## 2023-05-11 PROCEDURE — 70553 MRI BRAIN STEM W/O & W/DYE: CPT | Mod: 26

## 2023-05-11 PROCEDURE — 80053 COMPREHEN METABOLIC PANEL: CPT

## 2023-05-11 PROCEDURE — 36415 COLL VENOUS BLD VENIPUNCTURE: CPT

## 2023-05-11 PROCEDURE — 99285 EMERGENCY DEPT VISIT HI MDM: CPT | Mod: 25

## 2023-05-11 PROCEDURE — 80307 DRUG TEST PRSMV CHEM ANLYZR: CPT

## 2023-05-11 PROCEDURE — 93005 ELECTROCARDIOGRAM TRACING: CPT

## 2023-05-11 PROCEDURE — 70553 MRI BRAIN STEM W/O & W/DYE: CPT

## 2023-05-11 RX ORDER — RISPERIDONE 4 MG/1
1 TABLET ORAL
Qty: 30 | Refills: 0
Start: 2023-05-11 | End: 2023-06-09

## 2023-05-11 RX ORDER — ESCITALOPRAM OXALATE 10 MG/1
1 TABLET, FILM COATED ORAL
Qty: 0 | Refills: 0
Start: 2023-05-11

## 2023-05-11 RX ORDER — RISPERIDONE 4 MG/1
1 TABLET ORAL
Qty: 0 | Refills: 0 | DISCHARGE
Start: 2023-05-11

## 2023-05-11 RX ORDER — ESCITALOPRAM OXALATE 10 MG/1
1 TABLET, FILM COATED ORAL
Qty: 30 | Refills: 0
Start: 2023-05-11 | End: 2023-06-09

## 2023-05-11 RX ADMIN — Medication 1 MILLIGRAM(S): at 09:15

## 2023-05-11 RX ADMIN — ESCITALOPRAM OXALATE 5 MILLIGRAM(S): 10 TABLET, FILM COATED ORAL at 17:24

## 2023-05-11 NOTE — DISCHARGE NOTE PROVIDER - CARE PROVIDER_API CALL
Itzel Peters)  [facility]  [address] [place] [address]  Phone: [phone]  Fax: [phone]  Follow Up Time:

## 2023-05-11 NOTE — BH CONSULTATION LIAISON PROGRESS NOTE - NSICDXBHSECONDARYDX_PSY_ALL_CORE
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

## 2023-05-11 NOTE — BH CONSULTATION LIAISON PROGRESS NOTE - NSBHATTESTBILLING_PSY_A_CORE
32024-Ivcbdlrxln OBS or IP - high complexity OR 50-79 mins
06964-Byuwlauwiq OBS or IP - high complexity OR 50-79 mins

## 2023-05-11 NOTE — OCCUPATIONAL THERAPY INITIAL EVALUATION ADULT - GENERAL OBSERVATIONS, REHAB EVAL
Pt received seated in bedside chair +PIV and mother present. Pt able to verbalize complete sentences and ideas during time of IE. Functional deficits appear to be psychological at this time and safe to be dc home from functional stand point. Pt left seated in bedside chair with call bell in reach, mother present, and lines intact

## 2023-05-11 NOTE — DISCHARGE NOTE PROVIDER - NSDCFUADDAPPT_GEN_ALL_CORE_FT
Would refer to [facility]. Address: [address], [place] [address], Phone: [phone] Would refer to [facility]. Address: [address], [place] [address], Phone: [phone]    Follow up with Dr lua

## 2023-05-11 NOTE — OCCUPATIONAL THERAPY INITIAL EVALUATION ADULT - PLANNED THERAPY INTERVENTIONS, OT EVAL
Pt to appropriately use compensatory strategies ~75% of time when stressors present to increase ability to engage in functional activities

## 2023-05-11 NOTE — BH CONSULTATION LIAISON PROGRESS NOTE - NSBHCHARTREVIEWLAB_PSY_A_CORE FT
15.3   6.14  )-----------( 243      ( 09 May 2023 07:45 )             44.6   05-09    139  |  104  |  7   ----------------------------<  91  3.8   |  24  |  1.04  UTOX- negative, see results.     Ca    9.5      09 May 2023 07:45    TPro  7.7  /  Alb  4.2  /  TBili  0.8  /  DBili  x   /  AST  13  /  ALT  18  /  AlkPhos  36<L>  05-09  
                      15.3   6.14  )-----------( 243      ( 09 May 2023 07:45 )             44.6   05-09    139  |  104  |  7   ----------------------------<  91  3.8   |  24  |  1.04  UTOX- negative, see results.     Ca    9.5      09 May 2023 07:45    TPro  7.7  /  Alb  4.2  /  TBili  0.8  /  DBili  x   /  AST  13  /  ALT  18  /  AlkPhos  36<L>  05-09

## 2023-05-11 NOTE — OCCUPATIONAL THERAPY INITIAL EVALUATION ADULT - MANUAL MUSCLE TESTING RESULTS, REHAB EVAL
Pt reports weakness however, observing patient in room appears to have functional strength/no strength deficits were identified

## 2023-05-11 NOTE — PROGRESS NOTE ADULT - NUTRITIONAL ASSESSMENT
This patient has been assessed with a concern for Malnutrition and has been determined to have a diagnosis/diagnoses of Severe protein-calorie malnutrition.    This patient is being managed with:   Diet Regular-  Supplement Feeding Modality:  Oral  Ensure Enlive Cans or Servings Per Day:  2       Frequency:  Three Times a day  Entered: May 10 2023  9:52AM

## 2023-05-11 NOTE — PROGRESS NOTE ADULT - SUBJECTIVE AND OBJECTIVE BOX
Patient is a 38y old  Female who presents with a chief complaint of Dysphagia     (10 May 2023 10:46)    mother at bedside  using word chart to communicate   Patient seen and examined at bedside.    ALLERGIES:  No Known Drug Allergies  apple (Short breath)    MEDICATIONS  (STANDING):  escitalopram 5 milliGRAM(s) Oral <User Schedule>  risperiDONE  Disintegrating Tablet 0.25 milliGRAM(s) Oral at bedtime  sodium chloride 0.9%. 1000 milliLiter(s) (70 mL/Hr) IV Continuous <Continuous>    MEDICATIONS  (PRN):  acetaminophen     Tablet .. 650 milliGRAM(s) Oral every 6 hours PRN Temp greater or equal to 38C (100.4F), Mild Pain (1 - 3)  aluminum hydroxide/magnesium hydroxide/simethicone Suspension 30 milliLiter(s) Oral every 4 hours PRN Dyspepsia  LORazepam     Tablet 0.5 milliGRAM(s) Oral two times a day PRN Anxiety  melatonin 3 milliGRAM(s) Oral at bedtime PRN Insomnia  ondansetron Injectable 4 milliGRAM(s) IV Push every 8 hours PRN Nausea and/or Vomiting    Vital Signs Last 24 Hrs  T(F): 97.9 (10 May 2023 06:49), Max: 98.1 (09 May 2023 16:36)  HR: 76 (10 May 2023 06:49) (76 - 88)  BP: 108/71 (10 May 2023 06:49) (108/71 - 126/85)  RR: 16 (10 May 2023 06:49) (16 - 16)  SpO2: 100% (10 May 2023 06:49) (99% - 100%)  I&O's Summary    BMI (kg/m2): 24.3 (05-09-23 @ 06:56)  PHYSICAL EXAM:  General: NAD, + vocal tics, anxious   ENT: MMM, no thrush  Neck: Supple, No JVD  Lungs: Non labored breathing,  Clear to auscultation bilaterally,   Cardio: RRR, S1/S2, no pitting edema bilaterally  Abdomen: Soft, Nontender, Nondistended; Bowel sounds present  Extremities: No calf tenderness, moves all extremities but reports generalized weakness    LABS:                        15.3   6.14  )-----------( 243      ( 09 May 2023 07:45 )             44.6       05-09    139  |  104  |  7   ----------------------------<  91  3.8   |  24  |  1.04    Ca    9.5      09 May 2023 07:45    TPro  7.7  /  Alb  4.2  /  TBili  0.8  /  DBili  x   /  AST  13  /  ALT  18  /  AlkPhos  36  05-09                                          RADIOLOGY & ADDITIONAL TESTS:    Care Discussed with Consultants/Other Providers:   
Patient is a 38y old  Female who presents with a chief complaint of Unable to speak or swallow (10 May 2023 11:32)  Patient speaking this AM  MRI done this AM--patient tolerated well     Patient seen and examined at bedside.    ALLERGIES:  Pineapple (Unknown)  No Known Drug Allergies  Peaches (Unknown)  apple (Short breath)    MEDICATIONS  (STANDING):  escitalopram 5 milliGRAM(s) Oral <User Schedule>  risperiDONE  Disintegrating Tablet 0.25 milliGRAM(s) Oral at bedtime  sodium chloride 0.9%. 1000 milliLiter(s) (70 mL/Hr) IV Continuous <Continuous>    MEDICATIONS  (PRN):  acetaminophen     Tablet .. 650 milliGRAM(s) Oral every 6 hours PRN Temp greater or equal to 38C (100.4F), Mild Pain (1 - 3)  aluminum hydroxide/magnesium hydroxide/simethicone Suspension 30 milliLiter(s) Oral every 4 hours PRN Dyspepsia  LORazepam     Tablet 0.5 milliGRAM(s) Oral two times a day PRN Anxiety  melatonin 3 milliGRAM(s) Oral at bedtime PRN Insomnia  ondansetron Injectable 4 milliGRAM(s) IV Push every 8 hours PRN Nausea and/or Vomiting    Vital Signs Last 24 Hrs  T(F): 97.4 (11 May 2023 05:26), Max: 97.6 (10 May 2023 19:37)  HR: 103 (11 May 2023 05:26) (90 - 103)  BP: 126/75 (11 May 2023 05:26) (108/70 - 135/88)  RR: 16 (11 May 2023 05:26) (15 - 16)  SpO2: 100% (11 May 2023 05:26) (96% - 100%)  I&O's Summary    10 May 2023 07:01  -  11 May 2023 07:00  --------------------------------------------------------  IN: 240 mL / OUT: 0 mL / NET: 240 mL      BMI (kg/m2): 24.3 (05-09-23 @ 06:56)  PHYSICAL EXAM:  General: NAD, able to speak, still reports UE weakness , + vocal tics but overall improved   ENT: MMM, no thrush  Neck: Supple, No JVD  Lungs: Non labored breathing,  Clear to auscultation bilaterally,   Cardio: RRR, S1/S2, no pitting edema bilaterally  Abdomen: Soft, Nontender, Nondistended; Bowel sounds present  Extremities: No calf tenderness, moves all extremities    LABS:                        15.3   6.14  )-----------( 243      ( 09 May 2023 07:45 )             44.6       05-09    139  |  104  |  7   ----------------------------<  91  3.8   |  24  |  1.04    Ca    9.5      09 May 2023 07:45    TPro  7.7  /  Alb  4.2  /  TBili  0.8  /  DBili  x   /  AST  13  /  ALT  18  /  AlkPhos  36  05-09                                          RADIOLOGY & ADDITIONAL TESTS:  < from: MR Head w/wo IV Cont (05.11.23 @ 10:18) >  IMPRESSION:    Small abnormal juxtaventricular T2/FLAIR hyperintensity abutting the   right frontal horn without associated restricted diffusion or   enhancement, nonspecific, may represent a demyelinating process amongst   also other etiologies.    Chiari I malformation.    --- End of Report ---    < end of copied text >      Care Discussed with Consultants/Other Providers:   
General Neurology  Consult Progress Note    Interval history:  The patient was seen and examined at bedside in [facility]. She reports feeling back to normal. Per her mother, after receiving Ativan prior to her MRI brain, she started speaking and was able to eat and swallow normally. She continues to feel well. Discussed results of MRI brain.    HPI:  This is a 38F with a PMHx of anxiety (untreated, does not see a psychiatrist) who presents for "unable to swallow and speak." Patient has been treated on/off for sinusitis with antibiotics over the last 2 years - has been on intermittent antibiotics and steroids. Patient lives with her mother at home - and decided about 1 month ago she was going to move out of the house - which then resulted in "vocal tics" (blurts out words uncontrollably), and gradual decreased ability to swallow and speak. Also, had three close relatives die recently. She can whisper if she pinches her neck and only ate aaron sips of soup yesterday. Patient saw an outpatient movement disorder specialist (Dr. Saunders) who recommend CBT. She did not make a psychiatry appt and now comes to the ED. ROS + for 20 lbs weight loss in past several weeks, anxiety/depression. No suicidal thoughts.    Neurology was consulted for hypophonic, dysphagia and tics.   Per the patient's mother, the patient has been under increased stress. Her symptoms started a few months after she was started on the steroids.     She denies headache, changes in vision, difficulty swallow. She is mute other than when she has her vocal tics where she says a unclear word without dysarthria, no profanity, and when she gently squeezes her trachea, she has a choppy voice. She denies LOC. She reports some trouble with balance but is otherwise able to walk.

## 2023-05-11 NOTE — DISCHARGE NOTE NURSING/CASE MANAGEMENT/SOCIAL WORK - NSDCPEFALRISK_GEN_ALL_CORE
For information on Fall & Injury Prevention, visit: [email or web address] OR  [email or web address] OR  https://www.cdc.gov/steadi/patient.html

## 2023-05-11 NOTE — OCCUPATIONAL THERAPY INITIAL EVALUATION ADULT - ADDITIONAL COMMENTS
As per pt and pt's mother, pt was independent with ADLs and IADLs prior to admission however, report "she needed help with some things". Pt reports "going to [employer]" independently however, mother would have to "wash hair." Pt does not own any DME and has no environmental barrier with home set-up. OT conclusion from pt and mother's support is that pt would occasionally require assistance during times of stress

## 2023-05-11 NOTE — BH CONSULTATION LIAISON PROGRESS NOTE - NSICDXBHTERTIARYDX_PSY_ALL_CORE
R/O OCD (obsessive compulsive disorder)   F42.9  R/O Conversion disorder   F44.9  R/O Agoraphobia, unspecified   F40.00  
R/O OCD (obsessive compulsive disorder)   F42.9  R/O Conversion disorder   F44.9  R/O Agoraphobia, unspecified   F40.00  R/O Catatonia   F06.1

## 2023-05-11 NOTE — DISCHARGE NOTE PROVIDER - NSDCMRMEDTOKEN_GEN_ALL_CORE_FT
escitalopram 5 mg oral tablet: 1 tab(s) orally once a day  Occupational therapy evaluate and treat for right upper extremity weakness. 3 days a week for 8 weeks: ICD R54 and E85. 9  risperiDONE 0.25 mg oral tablet, disintegratin tab(s) orally once a day (at bedtime)

## 2023-05-11 NOTE — BH CONSULTATION LIAISON PROGRESS NOTE - NSBHFUPINTERVALHXFT_PSY_A_CORE
Pending full note This is a 38F with no sig PMHx although does have underlying anxiety (untreated, does not see a psychiatrist) who presents for "unable to swallow and speak." Patient has been treated on/off for sinusitis with antibiotics over the last 2 years - has been on intermittent antibiotics and steroids. Other than that, no sig hx. Patient lives with her mother at home - and decided about 1 month ago she was going to move out of the house - which then resulted in "vocal tics" (blurts out words uncontrollably), and gradual decrease ability to swallow and speak. Also had three close relates die recently. She can whisper if she pinches her neck and only ate aaron sips of soup yesterday. Patient saw an outpatient movement disorder specialist (Dr. Saunders) who recommend CBT. She did not make a psychiatry appt and now comes to the ED. ROS + for 20 lbs weight loss in past several weeks, anxiety/depression. No suicidal thoughts. (09 May 2023 09:28)    Psychiatry C/L: Asked to evaluate this 38 year old female for various physical and psychological complaints. Pt seen and evaluated at bedside, she is pleasant, calm and cooperative with writer. Pt reports she has difficulty speaking and swallowing and is using nonverbal ways to communicate with writer including shaking her head (yes or no) as well as an alphabet chart and hand gestures. She is able to produce several words at a time but has hypophonic speech. Her mother provides most of the hx. Pt reports 2 months ago she suddenly developed a tic and has sudden twitches and nonsensical sounds. In addition to the tic, she has difficulty eating and swallowing and has a 20 lbs weight loss in the last month. Sleep- fair, appetite- poor, energy- low, concentration- poor. Pt reports she has several active stressors in her life and is not coping well: Her father  in , grandmother  in  and her uncle  in 2021 (she was close with all). Pt was planning to move out of her mothers house and into her own space 1 month ago which created more stress for her. Pts admits she has been anxious and depressed, exacerbated by the COVID-19 pandemic. Since the pandemic began, pt has been isolated and not leaving her home. Anxiety sx- intermittent worrisome thoughts, muscle tension, palpitations, sweating and SOB.  Pt has been evaluated by various medical specialist (Pulmonologist, PMD, neurologist, speech therapist)- all suggesting pt has a psychiatric illness as oppose to a medical etiology. She was referred to CBT and to an outpatient psychiatrist but has not followed up- had difficulty establishing an appt. Pt is not found to be suicidal, homicidal, manic or psychotic. See recs below.  (09 May 2023 13:07)    Psychiatry interval 5/10: Pt reports she slept better last night, other than that, no other changes noted. She tolerated her first dose of Risperdal and Lexapro. Swallow evaluation completed yesterday, notes read and appreciated- (grossly functional oropharyngeal swallow, however overall intake impacted by psychological component as "eating and speaking" exacerbate onset of Tics). Pt is pending MRI of head at his time. Psychiatry will continue to follow.     Psychiatry interval : Pt was given Ativan 1mg IVP this morning prior to going to her scheduled MRI and per staff within minutes she began speaking. Pt reports she feels "so much better" and she is glad she is able to speak now, in the differential is catatonia due to her response to Ativan.  Pt is able to swallow foods and her motor skills have improved. Pt reports she is motivated to f/u on her mental health, pursue therapy and medication management with a prescriber. She offers no new complaints. Her mental health discussed in full length with her mother present at bedside. Pt is not suicidal, homicidal, manic or psychotic, has brighter affect today. Rest of MSE below. See recs below.

## 2023-05-11 NOTE — OCCUPATIONAL THERAPY INITIAL EVALUATION ADULT - NSACTIVITYREC_GEN_A_OT
Continue OT with a mental health OT to address stressors and their impact on pt's engagement and performance in functional activities

## 2023-05-11 NOTE — PROGRESS NOTE ADULT - ASSESSMENT
38F with no sign PMx with now progressive inability to talk and swallow, in addition to increased vocal tics, in setting of recent stressors, now admitted for inability to maintain adequate PO intake    #Dysphagia  #Hypophonia  #Vocal tics  #Possible psychosomatic disorder  - Possible underlying psychiatric component - symptoms started in setting of stressors (recent family deaths and decision to move out of the house)--patient is convinced combo of claritin with prednisone in march triggered symptoms.   -Psychiatry consult - started risperdal and lexapro 5 mg and outpatient follow up --would refer to [facility]   -Neurology consult - Dr Peters-  - MRI head: may represent a demyelinating process amongst also other etiologies.Chiari I malformation.  - S+S eval with regular diet w thin liquids  - OT eval appreciated     Case d/w patient's mother at bedside and facetimes with sister Mackenzie. All questions answered, agrees with plan  Dispo: suspect home today pending neuro clearance     *Case d/w Dr Peters

## 2023-05-11 NOTE — BH CONSULTATION LIAISON PROGRESS NOTE - NSBHASSESSMENTFT_PSY_ALL_CORE
Pt is a 38 year old female who presents to [facility] for various physical and psychological complaints. Psychiatry following patient for management of anxiety, depression, tic disorder and r/o conversion disorder. Pt started on Lexapro and Risperdal, tolerating well so far. Speech and swallow eval completed. She is pending an MRI of the brain.   
Pt is a 38 year old female who presents to [facility] for various physical and psychological complaints. Psychiatry following patient for management of anxiety, depression, tic disorder. Pt started on Lexapro and Risperdal, tolerating well so far. Her ability to speak and swallow improved after receiving Ativan IVP.  MRI completed. Catatonia is highest on the differential.

## 2023-05-11 NOTE — OCCUPATIONAL THERAPY INITIAL EVALUATION ADULT - MD ORDER
MD order received. Chart reviewed and RN cleared for OT. IE complete-refer below for findings. Pt not in need of skilled OT on medical floors

## 2023-05-11 NOTE — BH CONSULTATION LIAISON PROGRESS NOTE - NSBHCHARTREVIEWVS_PSY_A_CORE FT
Vital Signs Last 24 Hrs  T(C): 36.3 (11 May 2023 05:26), Max: 36.4 (10 May 2023 19:37)  T(F): 97.4 (11 May 2023 05:26), Max: 97.6 (10 May 2023 19:37)  HR: 103 (11 May 2023 05:26) (90 - 103)  BP: 126/75 (11 May 2023 05:26) (108/70 - 135/88)  BP(mean): --  RR: 16 (11 May 2023 05:26) (15 - 16)  SpO2: 100% (11 May 2023 05:26) (96% - 100%)    Parameters below as of 11 May 2023 05:26  Patient On (Oxygen Delivery Method): room air    
Vital Signs Last 24 Hrs  T(C): 36.6 (10 May 2023 06:49), Max: 36.7 (09 May 2023 16:36)  T(F): 97.9 (10 May 2023 06:49), Max: 98.1 (09 May 2023 16:36)  HR: 76 (10 May 2023 06:49) (76 - 88)  BP: 108/71 (10 May 2023 06:49) (108/71 - 126/85)  BP(mean): --  RR: 16 (10 May 2023 06:49) (16 - 16)  SpO2: 100% (10 May 2023 06:49) (99% - 100%)    Parameters below as of 10 May 2023 06:49  Patient On (Oxygen Delivery Method): room air

## 2023-05-11 NOTE — DISCHARGE NOTE PROVIDER - HOSPITAL COURSE
38F with no sig PMHx although does have underlying anxiety (untreated, does not see a psychiatrist) who presents for "unable to swallow and speak." Patient has been treated on/off for sinusitis with antibiotics over the last 2 years - has been on intermittent antibiotics and steroids. Other than that, no sig hx. Patient lives with her mother at home - and decided about 1 month ago she was going to move out of the house - which then resulted in "vocal tics" (blurts out words uncontrollably), and gradual decrease ability to swallow and speak. Also had three close relates die recently. She can whisper if she pinches her neck and only ate aaron sips of soup yesterday. Patient saw an outpatient movement disorder specialist (Dr. Saunders) who recommend CBT. She did not make a psychiatry appt and now comes to the ED. ROS + for 20 lbs weight loss in past several weeks, anxiety/depression. No suicidal thoughts.    Psych consulted and rec to start Lexapro 5mg PO Q daily, increase to 10mg after several days if tolerated. Also trialled risperdol . Psych rec outpatient follow up.   After initiation of medications, patient started to exhibit improvement of symptoms with speaking and eating    Neuro consulted . Underwent MRI with Small abnormal juxtaventricular T2/FLAIR hyperintensity abutting the right frontal horn without associated restricted diffusion or   enhancement, nonspecific, may represent a demyelinating process amongst also other etiologies.  Chiari I malformation. Neuro rec ____________    OT consulted and rec            38F with no sig PMHx although does have underlying anxiety (untreated, does not see a psychiatrist) who presents for "unable to swallow and speak." Patient has been treated on/off for sinusitis with antibiotics over the last 2 years - has been on intermittent antibiotics and steroids. Other than that, no sig hx. Patient lives with her mother at home - and decided about 1 month ago she was going to move out of the house - which then resulted in "vocal tics" (blurts out words uncontrollably), and gradual decrease ability to swallow and speak. Also had three close relates die recently. She can whisper if she pinches her neck and only ate aaron sips of soup yesterday. Patient saw an outpatient movement disorder specialist (Dr. Saunders) who recommend CBT. She did not make a psychiatry appt and now comes to the ED. ROS + for 20 lbs weight loss in past several weeks, anxiety/depression. No suicidal thoughts.    Psych consulted and rec to start Lexapro 5mg PO Q daily, increase to 10mg after several days if tolerated. Also trialled risperdol . Psych rec outpatient follow up.   After initiation of medications, patient started to exhibit improvement of symptoms with speaking and eating    Neuro consulted . Underwent MRI with Small abnormal juxtaventricular T2/FLAIR hyperintensity abutting the right frontal horn without associated restricted diffusion or   enhancement, nonspecific, may represent a demyelinating process amongst also other etiologies.  Chiari I malformation. Neuro rec outpatient EMG/NCS of upper extremity and outpatient neuro follow up      Rx given outpatient OT.  Patient referred to outpatient psych     Stable for discharge home with close outpatient follow up .

## 2023-05-11 NOTE — DISCHARGE NOTE NURSING/CASE MANAGEMENT/SOCIAL WORK - NSDPDISTO_GEN_ALL_CORE
Letter by Benita Rivera MD at      Author: Benita Rivera MD Service: -- Author Type: --    Filed:  Encounter Date: 2020 Status: (Other)         2020    Divina Blue   [address] [place] [address]   : 1962       Patient prescription:      RX:  Mattress for hospital bed    DX:      ICD-10-CM    1. Chronic neck pain M54.2     G89.29    2. Insomnia, unspecified type G47.00         LENGTH OF NEED (if appropriate):  99 years    Benita Rivera MD  [facility] [phone]   [place] License [id number]           
Home

## 2023-05-11 NOTE — PROGRESS NOTE ADULT - NS ATTEND AMEND GEN_ALL_CORE FT
38F with no sign PMx with now progressive inability to talk and swallow, in addition to increased vocal tics, in setting of recent stressors, now admitted for inability to maintain adequate PO intake. neuro and Psych consulted. meds adjusted. for MRI tomorrow. possible DC in 48 hrs pending MRI, psych and neuro follow up. psych cleared for dc when medically cleared.
Patient had MRI and speaking well. Likely will send with benzo. SW to coordinate outpatient psych services

## 2023-05-11 NOTE — DISCHARGE NOTE PROVIDER - ATTENDING DISCHARGE PHYSICAL EXAMINATION:
GENERAL: NAD  NERVOUS SYSTEM:  CN II - XII intact; Sensation intact; Motor Strength 5/5 B/L upper and lower extremities  PSYCH: Appropriate affect, Alert & Oriented x 3

## 2023-05-11 NOTE — DISCHARGE NOTE PROVIDER - NSDCCPCAREPLAN_GEN_ALL_CORE_FT
PRINCIPAL DISCHARGE DIAGNOSIS  Diagnosis: Dysphagia  Assessment and Plan of Treatment: You presented with difficulty eating, and talking. You were evaluated by psychiatry and neurology and MRI performed. You were started on lexapro and risperdal.    You should follow up with neurology and psychiatry as outpatient.      SECONDARY DISCHARGE DIAGNOSES  Diagnosis: Speech abnormality  Assessment and Plan of Treatment:

## 2023-05-11 NOTE — PROGRESS NOTE ADULT - ASSESSMENT
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   Psychiatry started new medications on her, including Risperidone, Lexapro and Ativan. After dose of Ativan, patient was able to speak again.     MRI brain with and without contrast was negative for acute pathology. No abnormal enhancement. Non-specific T2/FLAIR intensity near the right lateral ventricle. Chiari I malformation.     Recommend EMG/NCS of the upper extremities as an outpatient for bilateral hand weakness.  OT referral as outpatient.    Remainder of care per primary team and psychiatry.    Thank you for the consult. Neurology will sign off.

## 2023-05-11 NOTE — BH CONSULTATION LIAISON PROGRESS NOTE - CURRENT MEDICATION
MEDICATIONS  (STANDING):  escitalopram 5 milliGRAM(s) Oral <User Schedule>  risperiDONE  Disintegrating Tablet 0.25 milliGRAM(s) Oral at bedtime  sodium chloride 0.9%. 1000 milliLiter(s) (70 mL/Hr) IV Continuous <Continuous>    MEDICATIONS  (PRN):  acetaminophen     Tablet .. 650 milliGRAM(s) Oral every 6 hours PRN Temp greater or equal to 38C (100.4F), Mild Pain (1 - 3)  aluminum hydroxide/magnesium hydroxide/simethicone Suspension 30 milliLiter(s) Oral every 4 hours PRN Dyspepsia  LORazepam     Tablet 0.5 milliGRAM(s) Oral two times a day PRN Anxiety  melatonin 3 milliGRAM(s) Oral at bedtime PRN Insomnia  ondansetron Injectable 4 milliGRAM(s) IV Push every 8 hours PRN Nausea and/or Vomiting  
MEDICATIONS  (STANDING):  escitalopram 5 milliGRAM(s) Oral <User Schedule>  risperiDONE  Disintegrating Tablet 0.25 milliGRAM(s) Oral at bedtime  sodium chloride 0.9%. 1000 milliLiter(s) (70 mL/Hr) IV Continuous <Continuous>    MEDICATIONS  (PRN):  acetaminophen     Tablet .. 650 milliGRAM(s) Oral every 6 hours PRN Temp greater or equal to 38C (100.4F), Mild Pain (1 - 3)  aluminum hydroxide/magnesium hydroxide/simethicone Suspension 30 milliLiter(s) Oral every 4 hours PRN Dyspepsia  LORazepam     Tablet 0.5 milliGRAM(s) Oral two times a day PRN Anxiety  melatonin 3 milliGRAM(s) Oral at bedtime PRN Insomnia  ondansetron Injectable 4 milliGRAM(s) IV Push every 8 hours PRN Nausea and/or Vomiting

## 2023-05-11 NOTE — BH CONSULTATION LIAISON PROGRESS NOTE - NSBHCONSULTRECOMMENDOTHER_PSY_A_CORE FT
Continue Lexapro 5mg PO Q daily, increase to 10mg after several days if tolerated.   Neurology following- agree with same.   Consider auto immune workup if sx don't resolve.   Agree with MRI of the brain  Continue Risperdal trial to lessen tic sx  Ativan 0.5 PO BID as needed for anxiety sx.   Pt should be referred for CBT and grief counseling. Pt should be encouraged to establish an outpatient psychiatric prescriber for medication management. [facility] to [facility]. Address:  [place] [address], Phone: [phone]   consult to assist with after care. 
Ativan 0.5mg PO BID  Continue Lexapro 5mg PO Q daily, increase to 10mg on week two if well tolerated  Risperdal M tab 0.25mg PO Q HS  Recommend outpatient PT/OT  SW to assist with after care  Pt provided with mental health resources for F/U therapy and medication management  See below:  [facility].   Address: [address], [place] [address],  Phone: [phone]    [facility] [place]  Address: [address] [place] [address]  Phone: [phone]    [facility].  Address: [address] [place] [address]  Phone: [phone]      PT can be DC to home when medically cleared.   Call with questions.

## 2023-05-11 NOTE — DISCHARGE NOTE NURSING/CASE MANAGEMENT/SOCIAL WORK - PATIENT PORTAL LINK FT
You can access the FollowMyHealth Patient Portal offered by [facility] by registering at the following website: [email or web address]. By joining [facility]’s FollowMyHealth portal, you will also be able to view your health information using other applications (apps) compatible with our system.

## 2023-05-11 NOTE — OCCUPATIONAL THERAPY INITIAL EVALUATION ADULT - FINE MOTOR COORDINATION EXAM
Pt reports difficulty with hand writing and poor endurance however, dexterity appearing functional when observing patient perform ADLs within the room/Left UE/Right UE

## 2023-05-11 NOTE — BH CONSULTATION LIAISON PROGRESS NOTE - NSBHCHARTREVIEWINVESTIGATE_PSY_A_CORE FT
< from: 12 Lead ECG (05.09.23 @ 08:05) >    Ventricular Rate 83 BPM    Atrial Rate 83 BPM    P-R Interval 160 ms    QRS Duration 86 ms    Q-T Interval 394 ms    QTC Calculation(Bazett) 462 ms    P Axis 67 degrees    R Axis 64 degrees    T Axis 47 degrees    Diagnosis Line Normal sinus rhythmwith sinus arrhythmia  No previous ECGs available  Confirmed by Jaquan Huggins ([id number]) on 5/9/2023 4:09:49 PM    < end of copied text >    
< from: 12 Lead ECG (05.09.23 @ 08:05) >    Ventricular Rate 83 BPM    Atrial Rate 83 BPM    P-R Interval 160 ms    QRS Duration 86 ms    Q-T Interval 394 ms    QTC Calculation(Bazett) 462 ms    P Axis 67 degrees    R Axis 64 degrees    T Axis 47 degrees    Diagnosis Line Normal sinus rhythmwith sinus arrhythmia  No previous ECGs available  Confirmed by Jaquan Huggins ([id number]) on 5/9/2023 4:09:49 PM    < end of copied text >

## 2023-05-12 NOTE — CHART NOTE - NSCHARTNOTEFT_GEN_A_CORE
Spoke to  consultants. Patient to get ativan 0.5 BID for the next 7 days until appointment with mental health clinicans.    Discussed with psych

## 2023-09-08 NOTE — BH CONSULTATION LIAISON ASSESSMENT NOTE - ACCESS TO FIREARM
PULMONARY CONSULT SERVICE FOLLOW-UP NOTE    INTERVAL HPI:  Reviewed chart and overnight events; patient seen and examined at bedside.     MEDICATIONS:  Pulmonary:    Antimicrobials:  trimethoprim  160 mG/sulfamethoxazole 800 mG 1 Tablet(s) Oral daily    Anticoagulants:  enoxaparin Injectable 40 milliGRAM(s) SubCutaneous every 12 hours    Cardiac:      Allergies    No Known Allergies    Intolerances        Vital Signs Last 24 Hrs  T(C): 36.5 (08 Sep 2023 06:59), Max: 37.1 (07 Sep 2023 20:42)  T(F): 97.7 (08 Sep 2023 06:59), Max: 98.7 (07 Sep 2023 20:42)  HR: 85 (08 Sep 2023 06:59) (79 - 89)  BP: 119/77 (08 Sep 2023 06:59) (119/77 - 143/85)  BP(mean): 91 (08 Sep 2023 06:59) (91 - 100)  RR: 18 (08 Sep 2023 06:59) (16 - 18)  SpO2: 95% (08 Sep 2023 06:59) (94% - 95%)    Parameters below as of 08 Sep 2023 06:59  Patient On (Oxygen Delivery Method): nasal cannula  O2 Flow (L/min): 2          PHYSICAL EXAM:  Constitutional: WD  HEENT: NC/AT; PERRL, anicteric sclera; MMM  Neck: supple  Lymph: No supraclavical LAD or cervical chain LAD  Cardiovascular: +S1/S2, RRR  Respiratory: CTA B/L; no W/R/R  Gastrointestinal: soft, NT/ND  Extremities: WWP; no edema, clubbing or cyanosis  Vascular: 2+ radial and pedal pulses  Neurological: AAOx3; no focal deficits    LABS:    RADIOLOGY & ADDITIONAL STUDIES:    < from: Xray Chest 1 View- PORTABLE-Urgent (Xray Chest 1 View- PORTABLE-Urgent .) (09.07.23 @ 09:13) >    ACC: [id number] EXAM:  XR CHEST PORTABLE URGENT 1V   ORDERED BY: ALEX CARRASCO     PROCEDURE DATE:  09/07/2023          INTERPRETATION:  Portable chest    HISTORY: Abnormal breath sounds    IMPRESSION:    Increased hypoinflation compared to prior exam 8/30/2023. Bibasilar focal   atelectasis, lung base infiltrates cannot be excluded. Upper lungs   grossly clear. No pleural effusion or pneumothorax.    --- End of Report ---          < end of copied text >   No

## 2023-10-09 ENCOUNTER — APPOINTMENT (OUTPATIENT)
Dept: NEUROLOGY | Facility: CLINIC | Age: 38
End: 2023-10-09

## 2023-12-16 NOTE — DISCHARGE NOTE PROVIDER - DETAILS OF MALNUTRITION DIAGNOSIS/DIAGNOSES
This patient has been assessed with a concern for Malnutrition and was treated during this hospitalization for the following Nutrition diagnosis/diagnoses:     -  05/10/2023: Severe protein-calorie malnutrition   (1) Oriented to own ability

## 2024-01-02 ENCOUNTER — APPOINTMENT (OUTPATIENT)
Dept: NEUROLOGY | Facility: CLINIC | Age: 39
End: 2024-01-02

## 2025-03-11 ENCOUNTER — APPOINTMENT (OUTPATIENT)
Dept: OBGYN | Facility: CLINIC | Age: 40
End: 2025-03-11

## 2025-04-22 NOTE — DIETITIAN INITIAL EVALUATION ADULT - OTHER INFO
Chart reviewed and updated. Reconciled immunizations.  Imported urine lab into .    Jessica Chavez LPN   Clinical Care Coordinator  Primary Care and Wellness       This is a 38F with no sig PMHx although does have underlying anxiety (untreated, does not see a psychiatrist) who presents for "unable to swallow and speak." Patient has been treated on/off for sinusitis with antibiotics over the last 2 years - has been on intermittent antibiotics and steroids. Other than that, no sig hx. Patient lives with her mother at home - and decided about 1 month ago she was going to move out of the house - which then resulted in "vocal tics" (blurts out words uncontrollably), and gradual decrease ability to swallow and speak. Also had three close relates die recently. She can whisper if she pinches her neck and only ate aaron sips of soup yesterday. Patient saw an outpatient movement disorder specialist (Dr. Saunders) who recommend CBT. She did not make a psychiatry appt and now comes to the ED. ROS + for 20 lbs weight loss in past several weeks, anxiety/depression. No suicidal thoughts.    Patient with poor PO intakes at this time due to vocal tics/coughing. Skin intact. No edema note. Patient reports last BM to be on 5/7/23. SLP eval noted on 5/9/23 w/ recommendations for regular diet w/ thin liquids. Would suggest SLP reevaluation. Patient receptive to receiving Ensure Plus High Protein 8oz PO TID (Provides 1,050kcal & 60grams of Protein) and activia yogurt smoothie at meals to enhance PO intakes and optimize nutritional status. Additional food preferences obtained in which pt thinks she may be able to tolerate.  This is a 38F with no sig PMHx although does have underlying anxiety (untreated, does not see a psychiatrist) who presents for "unable to swallow and speak." Patient has been treated on/off for sinusitis with antibiotics over the last 2 years - has been on intermittent antibiotics and steroids. Other than that, no sig hx. Patient lives with her mother at home - and decided about 1 month ago she was going to move out of the house - which then resulted in "vocal tics" (blurts out words uncontrollably), and gradual decrease ability to swallow and speak. Also had three close relates die recently. She can whisper if she pinches her neck and only ate aaron sips of soup yesterday. Patient saw an outpatient movement disorder specialist (Dr. Saunders) who recommend CBT. She did not make a psychiatry appt and now comes to the ED. ROS + for 20 lbs weight loss in past several weeks, anxiety/depression. No suicidal thoughts.    Patient with poor PO intakes at this time due to vocal tics/coughing. Skin intact. No edema note. Patient reports last BM to be on 5/7/23. SLP eval noted on 5/9/23 w/ recommendations for regular diet w/ thin liquids. Would suggest SLP reevaluation. Patient receptive to receiving Ensure Plus High Protein 8oz PO TID (Provides 1,050kcal & 60grams of Protein) and activa yogurt smoothie at meals to enhance PO intakes and optimize nutritional status. Additional food preferences obtained in which pt thinks she may be able to tolerate.

## 2025-05-12 NOTE — PROGRESS NOTE ADULT - NS_MD_PANP_GEN_ALL_CORE
Attending and PA/NP shared services statement (NON-critical care):
Attending and PA/NP shared services statement (NON-critical care):
pcp